# Patient Record
Sex: MALE | Race: WHITE | Employment: FULL TIME | URBAN - METROPOLITAN AREA
[De-identification: names, ages, dates, MRNs, and addresses within clinical notes are randomized per-mention and may not be internally consistent; named-entity substitution may affect disease eponyms.]

---

## 2019-12-01 ENCOUNTER — HOSPITAL ENCOUNTER (OUTPATIENT)
Age: 24
Discharge: HOME | End: 2019-12-01
Attending: EMERGENCY MEDICINE
Payer: COMMERCIAL

## 2019-12-01 VITALS
HEIGHT: 73 IN | RESPIRATION RATE: 18 BRPM | DIASTOLIC BLOOD PRESSURE: 68 MMHG | SYSTOLIC BLOOD PRESSURE: 116 MMHG | HEART RATE: 95 BPM | OXYGEN SATURATION: 99 % | WEIGHT: 200 LBS | TEMPERATURE: 101 F | BODY MASS INDEX: 26.51 KG/M2

## 2019-12-01 DIAGNOSIS — B96.89 ACUTE BACTERIAL PHARYNGITIS: Primary | ICD-10-CM

## 2019-12-01 DIAGNOSIS — J02.8 ACUTE BACTERIAL PHARYNGITIS: Primary | ICD-10-CM

## 2019-12-01 PROCEDURE — 85025 COMPLETE CBC W/AUTO DIFF WBC: CPT | Performed by: EMERGENCY MEDICINE

## 2019-12-01 PROCEDURE — 86308 HETEROPHILE ANTIBODY SCREEN: CPT | Performed by: EMERGENCY MEDICINE

## 2019-12-01 PROCEDURE — 87430 STREP A AG IA: CPT

## 2019-12-01 PROCEDURE — 99204 OFFICE O/P NEW MOD 45 MIN: CPT

## 2019-12-01 PROCEDURE — 36415 COLL VENOUS BLD VENIPUNCTURE: CPT

## 2019-12-01 PROCEDURE — 99203 OFFICE O/P NEW LOW 30 MIN: CPT

## 2019-12-01 RX ORDER — IBUPROFEN 600 MG/1
600 TABLET ORAL ONCE
Status: COMPLETED | OUTPATIENT
Start: 2019-12-01 | End: 2019-12-01

## 2019-12-01 RX ORDER — CEFDINIR 300 MG/1
300 CAPSULE ORAL 2 TIMES DAILY
Qty: 14 CAPSULE | Refills: 0 | Status: SHIPPED | OUTPATIENT
Start: 2019-12-01 | End: 2019-12-08

## 2019-12-01 NOTE — ED PROVIDER NOTES
Patient Seen in: [facility] [place]      History   Stated Complaint: sore throat    HPI    Patient complains of sore throat and fever for the past day.   The patient stated he did receive influenza vaccination this season    Past EM POCT RAPID STREP - Normal                  MDM     Discussed empiric antibiotic treatment with the patient as result of elevated white blood cell count indicating likely bacterial infection              Disposition and Plan     Clinical Impression:

## 2019-12-01 NOTE — ED INITIAL ASSESSMENT (HPI)
Pt here to IC with c/o fever, chills, sorethroat that started yesterday. Sister does currently have mono. Resp easy and regular.

## 2020-07-20 ENCOUNTER — TELEPHONE (OUTPATIENT)
Dept: INTERNAL MEDICINE CLINIC | Facility: CLINIC | Age: 25
End: 2020-07-20

## 2020-07-20 ENCOUNTER — TELEMEDICINE (OUTPATIENT)
Dept: INTERNAL MEDICINE CLINIC | Facility: CLINIC | Age: 25
End: 2020-07-20
Payer: COMMERCIAL

## 2020-07-20 DIAGNOSIS — U07.1 LAB TEST POSITIVE FOR DETECTION OF COVID-19 VIRUS: Primary | ICD-10-CM

## 2020-07-20 PROCEDURE — 99213 OFFICE O/P EST LOW 20 MIN: CPT | Performed by: NURSE PRACTITIONER

## 2020-07-20 NOTE — PROGRESS NOTES
HPI:    Patient ID: Liz Paulino is a 22year old male. Please note that the following visit was completed using two-way, real-time interactive audio and/or video communication.   This has been done in good mauro to provide continuity of care in the bes chest pain, palpitations and leg swelling. Gastrointestinal: Negative for nausea, vomiting, abdominal pain, diarrhea and constipation. Endocrine: Negative for cold intolerance and heat intolerance. Genitourinary: Negative for dysuria and hematuria. follow strict handwashing  2) Isolate   3) Multi-vitamin with zinc  4) Vitamin C 1000 mg  daily  5) Use separate bathrooms if possible  6) Wear a mask  7) Do not use aspirin, ibuprofen,aleve or any NSAIDS.     Patient instructed that he might want to get a

## 2020-07-20 NOTE — TELEPHONE ENCOUNTER
Patient calling reports has tested COVID + on Thursday 7/16/2020, from [facility]     Has symptoms of fever, body aches, weakness, cough, lost sense both smell and tastes     Asking for an aapt to discuss care plan, dad is pt of Dr. Hoa Still to try OTC

## 2022-08-14 ENCOUNTER — HOSPITAL ENCOUNTER (OUTPATIENT)
Age: 27
Discharge: HOME | End: 2022-08-14
Payer: COMMERCIAL

## 2022-08-14 VITALS
DIASTOLIC BLOOD PRESSURE: 60 MMHG | HEIGHT: 73 IN | HEART RATE: 50 BPM | WEIGHT: 195 LBS | RESPIRATION RATE: 18 BRPM | BODY MASS INDEX: 25.84 KG/M2 | TEMPERATURE: 98 F | SYSTOLIC BLOOD PRESSURE: 105 MMHG | OXYGEN SATURATION: 100 %

## 2022-08-14 DIAGNOSIS — A08.4 VIRAL ENTERITIS: Primary | ICD-10-CM

## 2022-08-14 PROCEDURE — 99213 OFFICE O/P EST LOW 20 MIN: CPT | Performed by: PHYSICIAN ASSISTANT

## 2022-08-14 RX ORDER — LIDOCAINE HYDROCHLORIDE 20 MG/ML
5 SOLUTION OROPHARYNGEAL ONCE
Status: COMPLETED | OUTPATIENT
Start: 2022-08-14 | End: 2022-08-14

## 2022-08-14 RX ORDER — DICYCLOMINE HYDROCHLORIDE 10 MG/5ML
20 SOLUTION ORAL ONCE
Status: COMPLETED | OUTPATIENT
Start: 2022-08-14 | End: 2022-08-14

## 2022-08-14 RX ORDER — MAGNESIUM HYDROXIDE/ALUMINUM HYDROXICE/SIMETHICONE 120; 1200; 1200 MG/30ML; MG/30ML; MG/30ML
30 SUSPENSION ORAL ONCE
Status: COMPLETED | OUTPATIENT
Start: 2022-08-14 | End: 2022-08-14

## 2022-08-14 RX ORDER — FAMOTIDINE 20 MG/1
20 TABLET, FILM COATED ORAL 2 TIMES DAILY PRN
Qty: 10 TABLET | Refills: 0 | Status: SHIPPED | OUTPATIENT
Start: 2022-08-14 | End: 2022-08-19

## 2022-08-14 RX ORDER — ONDANSETRON 4 MG/1
4 TABLET, ORALLY DISINTEGRATING ORAL EVERY 4 HOURS PRN
Qty: 10 TABLET | Refills: 0 | Status: SHIPPED | OUTPATIENT
Start: 2022-08-14 | End: 2022-08-21

## 2022-08-14 RX ORDER — DICYCLOMINE HCL 20 MG
20 TABLET ORAL 4 TIMES DAILY PRN
Qty: 20 TABLET | Refills: 0 | Status: SHIPPED | OUTPATIENT
Start: 2022-08-14 | End: 2022-08-19

## 2022-08-14 NOTE — ED INITIAL ASSESSMENT (HPI)
Patient presents a&o 4/4 with complaints of RLQ and LLQ abd pain after eating meat \"With some grey in it\" on 8/4/22. States abd pain since, with interment nausea. . Normal BMs.  Seen at [facility] recently

## 2023-09-06 ENCOUNTER — OFFICE VISIT (OUTPATIENT)
Dept: DERMATOLOGY CLINIC | Facility: CLINIC | Age: 28
End: 2023-09-06

## 2023-09-06 DIAGNOSIS — L73.9 FOLLICULITIS: Primary | ICD-10-CM

## 2023-09-06 PROCEDURE — 99204 OFFICE O/P NEW MOD 45 MIN: CPT | Performed by: STUDENT IN AN ORGANIZED HEALTH CARE EDUCATION/TRAINING PROGRAM

## 2023-09-06 RX ORDER — KETOCONAZOLE 20 MG/ML
SHAMPOO TOPICAL
COMMUNITY
Start: 2023-06-19

## 2023-09-06 RX ORDER — CLOBETASOL PROPIONATE 0.46 MG/ML
SOLUTION TOPICAL
COMMUNITY
Start: 2023-06-19

## 2023-09-06 RX ORDER — DOXYCYCLINE HYCLATE 100 MG/1
100 CAPSULE ORAL 2 TIMES DAILY
Qty: 60 CAPSULE | Refills: 2 | Status: SHIPPED | OUTPATIENT
Start: 2023-09-06

## 2023-09-06 NOTE — PROGRESS NOTES
September 6, 2023    New patient     CHIEF COMPLAINT: folliculitis    HISTORY OF PRESENT ILLNESS: .    1. Dryness   Location: scalp   Duration: over 2 months  Signs and symptoms: itchy, flaky and painful  Current treatment: selsun blue shampoo and clobetasol solution  Past treatments: ketoconazole and doxy       DERM HISTORY:  History of skin cancer: No  History of chronic skin disease/condition: No    FAMILY HISTORY:  History of melanoma: No  History of chronic skin disease/condition: No    History/Other:    REVIEW OF SYSTEMS:  Constitutional: Denies fever, chills, unintentional weight loss. Skin as per HPI    PAST MEDICAL HISTORY:  Past Medical History:   Diagnosis Date    Astigmatism 2012    OD Mild (No Rx)    Myopia 2012    OU Mild (No Rx)    Subjective vision disturbance 2012    OU       Medications  Current Outpatient Medications   Medication Sig Dispense Refill    clobetasol 0.05 % External Solution APPLY TO AFFECTED AREAS ON SCALP ONCE DAILY AT BEDTIME AS NEEDED ITCHING      ketoconazole 2 % External Shampoo SHAMPOO SCALP THREE TIMES A WEEK FOR 1 MONTH THEN ONCE A WEEK FOR MAINTENANCE      tretinoin 0.025 % External Cream Apply pea-sized amount over cleansed faced nightly. Objective:    PHYSICAL EXAM:  General: awake, alert, no acute distress  Skin: Skin exam was performed today including the following: scalp. Pertinent findings include:   - with pink scaly plaques and pustules    ASSESSMENT & PLAN:  Pathophysiology of diagnoses discussed with patient. Therapeutic options reviewed. Risks, benefits, and alternatives discussed with patient. Instructions reviewed at length. #Folliculitis. - Prescribed doxycycline 100mg twice daily. Discussed potential side effects including GI upset and photosensitivity. Recommend taking with food, except milk products, calcium blocks the medication, so they should be avoided around the time of taking doxycycline.  Instructed patient to take at least 30 minutes before lying down. - In reserve: ciclopirox and clindamycin solution. May also consider course of diflucan.      Return to clinic: 2 months or sooner if something concerning arises     Magui Hurt MD

## 2023-10-02 ENCOUNTER — PATIENT MESSAGE (OUTPATIENT)
Dept: DERMATOLOGY CLINIC | Facility: CLINIC | Age: 28
End: 2023-10-02

## 2023-10-02 NOTE — TELEPHONE ENCOUNTER
From: Ricky Pope  To: Augustina Blackwell  Sent: 10/2/2023 3:21 PM CDT  Subject: Folliculitis follow-up    Mariusz Padilla,    [facility] you are doing well. I wanted to reach out as I am nearing the end of my first bottle of folliculitis antibiotics and wanted to get your thoughts on how to proceed from here. The bottle says I have 2 refills. My symptoms have definitely become more mild over the past few weeks. I would say they are still lingering a bit though. Let me know your thoughts.     Thank you

## 2023-10-17 RX ORDER — DOXYCYCLINE HYCLATE 50 MG/1
1 TABLET, FILM COATED ORAL 2 TIMES DAILY
Qty: 60 TABLET | Refills: 2 | Status: SHIPPED | OUTPATIENT
Start: 2023-10-17

## 2023-11-28 ENCOUNTER — OFFICE VISIT (OUTPATIENT)
Dept: DERMATOLOGY CLINIC | Facility: CLINIC | Age: 28
End: 2023-11-28
Payer: COMMERCIAL

## 2023-11-28 DIAGNOSIS — L73.9 FOLLICULITIS: ICD-10-CM

## 2023-11-28 DIAGNOSIS — L21.9 SEBORRHEIC DERMATITIS: Primary | ICD-10-CM

## 2023-11-28 PROCEDURE — 99213 OFFICE O/P EST LOW 20 MIN: CPT | Performed by: STUDENT IN AN ORGANIZED HEALTH CARE EDUCATION/TRAINING PROGRAM

## 2023-11-28 RX ORDER — FLUOCINONIDE TOPICAL SOLUTION USP, 0.05% 0.5 MG/ML
SOLUTION TOPICAL
Qty: 60 ML | Refills: 5 | Status: SHIPPED | OUTPATIENT
Start: 2023-11-28

## 2023-11-28 NOTE — PROGRESS NOTES
November 28, 2023    Established patient     CHIEF COMPLAINT: Folliculitis    HISTORY OF PRESENT ILLNESS: .    1. Folliculitis  Location: Scalp   Duration: 3 months  Signs and symptoms: Unsure  Current treatment: Ciclopirox,Clindamycin  Past treatments: Clobetasol, doxycycline      DERM HISTORY:  History of skin cancer: No  History of chronic skin disease/condition: No    FAMILY HISTORY:  History of melanoma: No  History of chronic skin disease/condition: No    History/Other:    REVIEW OF SYSTEMS:  Constitutional: Denies fever, chills, unintentional weight loss. Skin as per HPI    PAST MEDICAL HISTORY:  Past Medical History:   Diagnosis Date    Astigmatism 2012    OD Mild (No Rx)    Myopia 2012    OU Mild (No Rx)    Subjective vision disturbance 2012    OU       Medications  Current Outpatient Medications   Medication Sig Dispense Refill    Doxycycline Hyclate 50 MG Oral Tab Take 1 tablet by mouth in the morning and 1 tablet before bedtime. 60 tablet 2    clobetasol 0.05 % External Solution APPLY TO AFFECTED AREAS ON SCALP ONCE DAILY AT BEDTIME AS NEEDED ITCHING      ketoconazole 2 % External Shampoo SHAMPOO SCALP THREE TIMES A WEEK FOR 1 MONTH THEN ONCE A WEEK FOR MAINTENANCE      tretinoin 0.025 % External Cream Apply pea-sized amount over cleansed faced nightly. doxycycline 100 MG Oral Cap Take 1 capsule (100 mg total) by mouth 2 (two) times daily. 60 capsule 2       Objective:    PHYSICAL EXAM:  General: awake, alert, no acute distress  Skin: Skin exam was performed today including the following: scalp. Pertinent findings include:   - with greasy yellow scaling    ASSESSMENT & PLAN:  Pathophysiology of diagnoses discussed with patient. Therapeutic options reviewed. Risks, benefits, and alternatives discussed with patient. Instructions reviewed at length. #Seborrheic dermatitis  #Folliculitis  - Head and shoulders clinical strength shampoo once every other day.   Lather in and leave in for 3 to 5 minutes before washing off. - In the mornings recommended using Lidex 0.05% solution. Lather throughout scalp. - In the evenings recommended using clindamycin solution throughout entire scalp. - Continue use of benzoyl peroxide once every day in shower.     Return to clinic: 3 months or sooner if something concerning arises     Jon Carreon MD

## 2025-05-09 NOTE — PROGRESS NOTES
The following individual(s) verbally consented to be recorded using ambient AI listening technology and understand that they can each withdraw their consent to this listening technology at any point by asking the clinician to turn off or pause the recording:    Patient name: Freddy Bermudez

## 2025-05-12 NOTE — PROGRESS NOTES
Freddy Bermudez is a 29 year old male.    Chief Complaint   Patient presents with    Derm Problem     LOV 11/2023 w/ DM. Pt present for assessment of scalp irritation. Notes some dryness and flaking for the past few months that has gotten worse and now includes his nose.   Previous Tx: Fluocinonide and Doxy              Patient has no known allergies.  Current Medications[1]   Past Medical History[2]   Social History:  Short Social Hx on File[3]              Current Medications[4]  Allergies:   Allergies[5]    Past Medical History[6]  Past Surgical History[7]  Social History     Socioeconomic History    Marital status: Single     Spouse name: Not on file    Number of children: Not on file    Years of education: Not on file    Highest education level: Not on file   Occupational History    Not on file   Tobacco Use    Smoking status: Never    Smokeless tobacco: Not on file   Substance and Sexual Activity    Alcohol use: No    Drug use: Not on file    Sexual activity: Not on file   Other Topics Concern     Service Not Asked    Blood Transfusions Not Asked    Caffeine Concern No    Occupational Exposure Not Asked    Hobby Hazards Not Asked    Sleep Concern Not Asked    Stress Concern Not Asked    Weight Concern Not Asked    Special Diet Not Asked    Back Care Not Asked    Exercise Not Asked    Bike Helmet Not Asked    Seat Belt Not Asked    Self-Exams Not Asked    Grew up on a farm No    History of tanning Yes    Outdoor occupation No    Reaction to local anesthetic No    Pt has a pacemaker No    Pt has a defibrillator No   Social History Narrative    Not on file     Social Drivers of Health     Food Insecurity: Low Risk  (12/29/2022)    Received from [facility]    Food Insecurity     Have there been times that your food ran out, and you didn't have money to get more?: No     Are there times that you worry that this might happen?: No   Transportation Needs: Low Risk  (12/28/2022)     Received from [facility]    Transportation Needs     Do you have trouble getting transportation to medical appointments?: No     How do you normally get to and from your appointments?: Other   Stress: Not on file   Housing Stability: Not on file (12/28/2022)     Family History[8]                   HPI :      Chief Complaint   Patient presents with    Derm Problem     LOV 11/2023 w/ DM. Pt present for assessment of scalp irritation. Notes some dryness and flaking for the past few months that has gotten worse and now includes his nose.   Previous Tx: Fluocinonide and Doxy        History of Present Illness  Freddy Bermudez is a 29 year old male with chronic scalp folliculitis who presents with scalp irritation and acne-like bumps.    He experiences chronic scalp folliculitis characterized by irritation, redness, and acne-like bumps, particularly at the back of the scalp. The condition has persisted despite previous treatments. He has been using ciclopirox shampoo, initially prescribed for two weeks but later used indefinitely. Frequent use leads to excessive dryness, and he uses it more often than recommended, sometimes every other day.    Previously, he was prescribed doxycycline for two to three weeks, which resolved the symptoms temporarily. However, the symptoms returned after discontinuation. This was the only time the condition was completely resolved. He experienced sunburn previously while on the medication.    He experiences more flaking, scaling, and itching when the scalp is dry. He has not used steroid liquids recently, which were prescribed in the past.    For facial care, he uses Cerave facial moisturizer and occasionally Axone for acne. He also uses Cetaphil face cleanser in the shower. He notes increased razor burn and irritation after shaving, which has worsened over the past year.    No recent use of steroid liquids for the scalp. Acne-like bumps primarily at the back of the  scalp. Increased irritation and razor burn after shaving.    Patient presents with concerns above.      Past notes/ records and appropriate/relevant lab results including pathology and past body maps reviewed. Updated and new information noted in current visit.       ROS:    Denies any other systemic complaints.  No fevers, chills, night sweats, sensitivity to the sun, deeper lumps or bumps.  No other skin complaints.  History, medications, allergies as noted.    Physical examination: Patient  well-developed well-nourished, alert oriented in no acute distress.  Exam of involved, appropriate areas of skin performed,  Remarkable for lesions as noted   Physical Exam  SKIN: Scalp inflamed with chronic folliculitis    See map for details     ASSESSMENT AND PLAN:     Encounter Diagnoses   Name Primary?    Folliculitis Yes    Seborrheic dermatitis     Acne keloidalis      Meds & Refills for this Visit:   Requested Prescriptions     Signed Prescriptions Disp Refills    doxycycline 100 MG Oral Cap 60 capsule 2     Sig: Take 1 capsule (100 mg total) by mouth 2 (two) times daily. X 2 weeks then 1 po qd    Mometasone Furoate 0.1 % External Solution 60 mL 3     Sig: Apply bid to scalp as directed    ketoconazole 2 % External Cream 60 g 5     Sig: Apply to scaling areas on face and neck at bedtime in particular post shaving       No orders of the defined types were placed in this encounter.    Assessment / plan:    Assessment & Plan  Chronic folliculitis of the scalp  Chronic folliculitis of the scalp with inflamed hair follicles, presenting with acneiform eruptions and crusting. Previous doxycycline treatment was effective, but symptoms recurred post-discontinuation. Current ciclopirox shampoo is inadequate. Risk of progression to scarring areas. Doxycycline is selected for its anti-inflammatory properties and long-term safety at lower doses. Potential side effects include photosensitivity and gastrointestinal upset.  -  Prescribe doxycycline 100 mg twice daily for two weeks, then reduce to once daily for maintenance.  - Advise taking doxycycline with food to minimize gastrointestinal upset and caution about photosensitivity.  - Prescribe mometasone solution for the scalp to be applied once or twice daily post-showering.  - Recommend alternating ciclopirox shampoo with a gentle shampoo like CeraVe or Cetaphil.    Pseudofolliculitis barbae  Pseudofolliculitis barbae with increased irritation and pseudofolliculitis over the past year. Symptoms may be related to chronic folliculitis of the scalp. Ketoconazole cream is recommended for its ease of use and safety on the face.  - Prescribe ketoconazole cream for use on the face and neck, especially post-shaving.  - Advise against cutting hair too short in the back to prevent scarring.    Seborrheic dermatitis  Seborrheic dermatitis with symptoms of scalp irritation, erythema, and desquamation. Current ciclopirox shampoo use is causing xerosis. Alternating with a gentle shampoo is recommended to reduce xerosis.  - Recommend alternating ciclopirox shampoo with a gentle shampoo like CeraVe or Cetaphil.      Monitor for new or changing lesions. Signs and symptoms of skin cancer, ABCDE's of melanoma ( additional information available at AAD.org, skincancer.org) Encourage Sunscreen (broad-spectrum, ideally mineral-based-UVA/UVB -SPF 30 or higher) use encouraged, sun protection/sun protective clothing, self exams reviewed Followup as noted RTC ---routine checkup 6 mos -one year or p.r.n.    Encounter Times   Including precharting, reviewing chart, prior notes obtaining history: 10 minutes, medical exam :10 minutes, notes on body map, plan, counseling 10minutes My total time spent caring for the patient on the day of the encounter: 30 minutes     The patient indicates understanding of these issues and agrees to the plan.  The patient is asked to return as noted in follow-up/ above.    This note  was generated using Dragon voice recognition software.  Please contact me regarding any confusion resulting from errors in recognition..  Note to patient and family: The 21st Century Cures Act makes medical notes like these available to patients. However, be advised this is a medical document. It is intended as [id number] communication and monitoring of a patient's care needs. It is written in medical language and may contain abbreviations or verbiage that are unfamiliar. It may appear blunt or direct. Medical documents are intended to carry relevant information, facts as evident and the clinical opinion of the practitioner.      No orders of the defined types were placed in this encounter.          Encounter Diagnoses   Name Primary?    Folliculitis Yes    Seborrheic dermatitis     Acne keloidalis        No orders of the defined types were placed in this encounter.      Results From Past 48 Hours:  No results found for this or any previous visit (from the past 48 hours).    Meds This Visit:      Imaging Orders:  None     Referral Orders:  No orders of the defined types were placed in this encounter.                 [1]   Current Outpatient Medications   Medication Sig Dispense Refill    doxycycline 100 MG Oral Cap Take 1 capsule (100 mg total) by mouth 2 (two) times daily. X 2 weeks then 1 po qd 60 capsule 2    Mometasone Furoate 0.1 % External Solution Apply bid to scalp as directed 60 mL 3    ketoconazole 2 % External Cream Apply to scaling areas on face and neck at bedtime in particular post shaving 60 g 5    Fluocinonide 0.05 % External Solution Use twice daily Monday-Friday with flares. Do not use on face. (Patient not taking: Reported on 5/9/2025) 60 mL 5    Doxycycline Hyclate 50 MG Oral Tab Take 1 tablet by mouth in the morning and 1 tablet before bedtime. (Patient not taking: Reported on 5/9/2025) 60 tablet 2    clobetasol 0.05 % External Solution APPLY TO AFFECTED AREAS ON SCALP ONCE DAILY AT BEDTIME AS  NEEDED ITCHING (Patient not taking: Reported on 5/9/2025)      ketoconazole 2 % External Shampoo SHAMPOO SCALP THREE TIMES A WEEK FOR 1 MONTH THEN ONCE A WEEK FOR MAINTENANCE (Patient not taking: Reported on 5/9/2025)      tretinoin 0.025 % External Cream Apply pea-sized amount over cleansed faced nightly. (Patient not taking: Reported on 5/9/2025)      doxycycline 100 MG Oral Cap Take 1 capsule (100 mg total) by mouth 2 (two) times daily. (Patient not taking: Reported on 5/9/2025) 60 capsule 2   [2]   Past Medical History:   Astigmatism    OD Mild (No Rx)    Myopia    OU Mild (No Rx)    Subjective vision disturbance    OU   [3]   Social History  Socioeconomic History    Marital status: Single   Tobacco Use    Smoking status: Never   Substance and Sexual Activity    Alcohol use: No   Other Topics Concern    Caffeine Concern No    Grew up on a farm No    History of tanning Yes    Outdoor occupation No    Reaction to local anesthetic No    Pt has a pacemaker No    Pt has a defibrillator No     Social Drivers of Health     Food Insecurity: Low Risk  (12/29/2022)    Received from [facility]    Food Insecurity     Have there been times that your food ran out, and you didn't have money to get more?: No     Are there times that you worry that this might happen?: No   Transportation Needs: Low Risk  (12/28/2022)    Received from [facility]    Transportation Needs     Do you have trouble getting transportation to medical appointments?: No     How do you normally get to and from your appointments?: Other   [4]   Current Outpatient Medications   Medication Sig Dispense Refill    doxycycline 100 MG Oral Cap Take 1 capsule (100 mg total) by mouth 2 (two) times daily. X 2 weeks then 1 po qd 60 capsule 2    Mometasone Furoate 0.1 % External Solution Apply bid to scalp as directed 60 mL 3    ketoconazole 2 % External Cream Apply to scaling areas on face and neck at bedtime in particular  post shaving 60 g 5    Fluocinonide 0.05 % External Solution Use twice daily Monday-Friday with flares. Do not use on face. (Patient not taking: Reported on 5/9/2025) 60 mL 5    Doxycycline Hyclate 50 MG Oral Tab Take 1 tablet by mouth in the morning and 1 tablet before bedtime. (Patient not taking: Reported on 5/9/2025) 60 tablet 2    clobetasol 0.05 % External Solution APPLY TO AFFECTED AREAS ON SCALP ONCE DAILY AT BEDTIME AS NEEDED ITCHING (Patient not taking: Reported on 5/9/2025)      ketoconazole 2 % External Shampoo SHAMPOO SCALP THREE TIMES A WEEK FOR 1 MONTH THEN ONCE A WEEK FOR MAINTENANCE (Patient not taking: Reported on 5/9/2025)      tretinoin 0.025 % External Cream Apply pea-sized amount over cleansed faced nightly. (Patient not taking: Reported on 5/9/2025)      doxycycline 100 MG Oral Cap Take 1 capsule (100 mg total) by mouth 2 (two) times daily. (Patient not taking: Reported on 5/9/2025) 60 capsule 2   [5] No Known Allergies  [6]   Past Medical History:   Astigmatism    OD Mild (No Rx)    Myopia    OU Mild (No Rx)    Subjective vision disturbance    OU   [7] History reviewed. No pertinent surgical history.  [8]   Family History  Problem Relation Age of Onset    Cataracts Paternal Grandfather     Glaucoma Neg     Macular degeneration Neg     Diabetes Neg         family h/o